# Patient Record
Sex: FEMALE | URBAN - METROPOLITAN AREA
[De-identification: names, ages, dates, MRNs, and addresses within clinical notes are randomized per-mention and may not be internally consistent; named-entity substitution may affect disease eponyms.]

---

## 2023-07-07 ENCOUNTER — OFFICE VISIT (OUTPATIENT)
Facility: LOCATION | Age: 83
End: 2023-07-07
Payer: COMMERCIAL

## 2023-07-07 DIAGNOSIS — H26.491 OTHER SECONDARY CATARACT, RIGHT EYE: ICD-10-CM

## 2023-07-07 DIAGNOSIS — H59.032 CYSTOID MACULAR EDEMA FOLLOWING CATARACT SURGERY, LEFT EYE: Primary | ICD-10-CM

## 2023-07-07 PROCEDURE — 92004 COMPRE OPH EXAM NEW PT 1/>: CPT | Performed by: OPHTHALMOLOGY

## 2023-07-07 ASSESSMENT — KERATOMETRY
OD: 45.25
OS: 45.50

## 2023-07-07 ASSESSMENT — INTRAOCULAR PRESSURE
OS: 18
OD: 16

## 2023-07-07 NOTE — IMPRESSION/PLAN
Impression: Other secondary cataract, right eye: H26.491. Plan: Secondary cataract is visually significant and interfering with patient's visual funtion. Patient desires to see better and have Capsulotomy surgery. Retina is sufficiently stable to allow safe cataract surgery. Recommend YAG Capsulotomy. Risks, benefits, and alternatives discussed with patient. Patient agrees to proceed with surgery.

## 2023-07-07 NOTE — IMPRESSION/PLAN
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  Plan: Patient needs to see retina doctor for CME and RTC for 2 mos